# Patient Record
Sex: MALE | URBAN - METROPOLITAN AREA
[De-identification: names, ages, dates, MRNs, and addresses within clinical notes are randomized per-mention and may not be internally consistent; named-entity substitution may affect disease eponyms.]

---

## 2019-01-11 ENCOUNTER — OFFICE VISIT (OUTPATIENT)
Dept: URBAN - METROPOLITAN AREA CLINIC 11 | Facility: CLINIC | Age: 69
End: 2019-01-11
Payer: MEDICARE

## 2019-01-11 DIAGNOSIS — H25.13 AGE-RELATED NUCLEAR CATARACT, BILATERAL: ICD-10-CM

## 2019-01-11 PROCEDURE — 92014 COMPRE OPH EXAM EST PT 1/>: CPT | Performed by: OPTOMETRIST

## 2019-01-11 ASSESSMENT — INTRAOCULAR PRESSURE
OD: 17
OS: 18

## 2019-01-11 ASSESSMENT — VISUAL ACUITY
OD: 20/25
OS: 20/30

## 2019-01-31 ENCOUNTER — OFFICE VISIT (OUTPATIENT)
Dept: URBAN - METROPOLITAN AREA CLINIC 11 | Facility: CLINIC | Age: 69
End: 2019-01-31
Payer: COMMERCIAL

## 2019-01-31 DIAGNOSIS — H52.4 PRESBYOPIA: Primary | ICD-10-CM

## 2019-01-31 PROCEDURE — 92012 INTRM OPH EXAM EST PATIENT: CPT | Performed by: OPTOMETRIST

## 2019-01-31 PROCEDURE — 92015 DETERMINE REFRACTIVE STATE: CPT | Performed by: OPTOMETRIST

## 2019-01-31 ASSESSMENT — INTRAOCULAR PRESSURE
OS: 18
OD: 18

## 2019-01-31 ASSESSMENT — VISUAL ACUITY
OD: 20/25
OS: 20/25

## 2019-12-03 ENCOUNTER — APPOINTMENT (RX ONLY)
Dept: URBAN - METROPOLITAN AREA CLINIC 168 | Facility: CLINIC | Age: 69
Setting detail: DERMATOLOGY
End: 2019-12-03

## 2019-12-03 DIAGNOSIS — L95.0 LIVEDOID VASCULITIS: ICD-10-CM

## 2019-12-03 PROBLEM — E78.5 HYPERLIPIDEMIA, UNSPECIFIED: Status: ACTIVE | Noted: 2019-12-03

## 2019-12-03 PROBLEM — E13.9 OTHER SPECIFIED DIABETES MELLITUS WITHOUT COMPLICATIONS: Status: ACTIVE | Noted: 2019-12-03

## 2019-12-03 PROBLEM — J44.9 CHRONIC OBSTRUCTIVE PULMONARY DISEASE, UNSPECIFIED: Status: ACTIVE | Noted: 2019-12-03

## 2019-12-03 PROBLEM — I10 ESSENTIAL (PRIMARY) HYPERTENSION: Status: ACTIVE | Noted: 2019-12-03

## 2019-12-03 PROCEDURE — ? ORDER TESTS

## 2019-12-03 PROCEDURE — 99203 OFFICE O/P NEW LOW 30 MIN: CPT

## 2019-12-03 PROCEDURE — ? PRESCRIPTION

## 2019-12-03 PROCEDURE — ? COUNSELING

## 2019-12-03 RX ORDER — SILVER SULFADIAZINE 10 MG/G
1 CREAM TOPICAL BID
Qty: 1 | Refills: 3 | Status: ERX | COMMUNITY
Start: 2019-12-03

## 2019-12-03 RX ADMIN — SILVER SULFADIAZINE 1: 10 CREAM TOPICAL at 00:00

## 2019-12-03 ASSESSMENT — LOCATION SIMPLE DESCRIPTION DERM
LOCATION SIMPLE: RIGHT PRETIBIAL REGION
LOCATION SIMPLE: LEFT PRETIBIAL REGION

## 2019-12-03 ASSESSMENT — LOCATION DETAILED DESCRIPTION DERM
LOCATION DETAILED: RIGHT DISTAL PRETIBIAL REGION
LOCATION DETAILED: LEFT DISTAL PRETIBIAL REGION

## 2019-12-03 ASSESSMENT — LOCATION ZONE DERM: LOCATION ZONE: LEG

## 2019-12-03 NOTE — PROCEDURE: COUNSELING
Detail Level: Zone
Patient Specific Counseling (Will Not Stick From Patient To Patient): ****\\nAdvised patient to continue on aspirin QD, and will start pentoxyfilline TIDif it gives him diarrhea he can reduce to BID\\n\\nHypercoag work up ordered today, and discussed if this shows any hypercoag + result that we will refer to hematology\\n\\nHe already has a referral in place for vascular surgery (Office of Dr. Thao, but not sure which he will actually see) and so strongly encouraged him to follow through with this as this condition is likely related to his venous insufficiency primarily, and less likely related to hypercoagulability. \\n\\nFollow up if the above plan does not result in heal and clearance of the condition. Rarely this is treated with danazol if recalcitrant.

## 2019-12-03 NOTE — PROCEDURE: MIPS QUALITY
Quality 111:Pneumonia Vaccination Status For Older Adults: Pneumococcal Vaccination Previously Received
Detail Level: Detailed
Quality 226: Preventive Care And Screening: Tobacco Use: Screening And Cessation Intervention: Patient screened for tobacco use and is an ex/non-smoker
Quality 131: Pain Assessment And Follow-Up: Pain assessment NOT documented as being performed, documentation the patient is not eligible for a pain assessment using a standardized tool
Quality 474: Zoster Vaccination Status: Shingrix Vaccination Administered or Previously Received
Quality 110: Preventive Care And Screening: Influenza Immunization: Influenza Immunization Administered during Influenza season

## 2019-12-03 NOTE — HPI: ULCER
How Severe Is Your Sore?: moderate
Is This A New Presentation, Or A Follow-Up?: Ulcers
Additional History: Patient is here after being in the hospital for 5 days for the sores on his left leg. He states he had vein striping 10-15 years ago on left leg, and his left leg has always been more swollen than the right. He, nor anyone in his family, have had coagulation disorders. His father had some sort of LE ulcer that required amputation. History mostly provided by son. 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 Infectious work up reportedly negative from the intern's report, autoimmune work up largely pending.\\n\\nHe was treated with broad-spectrum antibiotics and steroids for 5 days with no improvement.

## 2020-09-10 ENCOUNTER — OFFICE VISIT (OUTPATIENT)
Dept: URBAN - METROPOLITAN AREA CLINIC 11 | Facility: CLINIC | Age: 70
End: 2020-09-10
Payer: MEDICARE

## 2020-09-10 DIAGNOSIS — E11.9 TYPE 2 DIABETES MELLITUS WITHOUT COMPLICATIONS: Primary | ICD-10-CM

## 2020-09-10 PROCEDURE — 92014 COMPRE OPH EXAM EST PT 1/>: CPT | Performed by: OPTOMETRIST

## 2020-09-10 ASSESSMENT — INTRAOCULAR PRESSURE
OS: 17
OD: 16

## 2020-09-10 NOTE — IMPRESSION/PLAN
Impression: Diagnosis: Age-related nuclear cataract, bilateral. Code: H25.13. Plan: Recommend cataract surgery OU; pt prefers to wait.  Will continue to observe condition and symptoms

## 2021-03-10 ENCOUNTER — RX ONLY (OUTPATIENT)
Age: 71
Setting detail: RX ONLY
End: 2021-03-10

## 2021-03-10 RX ORDER — PENTOXIFYLLINE 400 MG/1
1 TABLET, EXTENDED RELEASE ORAL TID
Qty: 90 | Refills: 0 | Status: ERX

## 2023-04-17 ENCOUNTER — TESTING ONLY (OUTPATIENT)
Facility: LOCATION | Age: 73
End: 2023-04-17
Payer: MEDICARE

## 2023-04-17 ASSESSMENT — PACHYMETRY
OS: 24.61
OS: 2.93
OD: 24.58
OD: 2.71

## 2023-04-19 ENCOUNTER — PRE-OPERATIVE VISIT (OUTPATIENT)
Facility: LOCATION | Age: 73
End: 2023-04-19
Payer: MEDICARE

## 2023-04-19 DIAGNOSIS — H25.811 COMBINED FORMS OF AGE-RELATED CATARACT, RIGHT EYE: ICD-10-CM

## 2023-04-19 DIAGNOSIS — H25.813 COMBINED FORMS OF AGE-RELATED CATARACT, BILATERAL: Primary | ICD-10-CM

## 2023-04-19 PROCEDURE — 99204 OFFICE O/P NEW MOD 45 MIN: CPT | Performed by: OPHTHALMOLOGY

## 2023-04-19 ASSESSMENT — VISUAL ACUITY
OS: 20/50
OD: 20/40

## 2023-04-19 ASSESSMENT — INTRAOCULAR PRESSURE
OD: 14
OS: 14

## 2023-04-19 ASSESSMENT — KERATOMETRY
OS: 41.88
OD: 42.13

## 2023-04-19 NOTE — IMPRESSION/PLAN
Impression: Combined forms of age-related cataract, bilateral: H25.813. Plan: OK for CE/Phaco/IOL OS then OD in ASC, RL2. Add Omidria. Distance target. Discussed lens options, Toric/Trifocal/ORA. Discussed intralacrimal Dextenza and/or intracameral Dexycu/Moxifloxacin. Pt is a candidate for multifocal lens. Discussed need for glasses for near vision with standard IOL and possibly Trifocal as well. Discussed diagnosis of cataracts. Cataracts are limiting vision. Discussed risks, benefits and alternatives to surgery including but not limited to: bleeding, infection, risk of vision loss, loss of the eye, need for other surgery. Patient voiced understanding and wishes to proceed. 
CE/Phaco/IOL OS then OD